# Patient Record
(demographics unavailable — no encounter records)

---

## 2019-02-08 NOTE — EDPHY
H & P


Stated Complaint: Laceration to back of head.


Source: Patient, Family


Exam Limitations: Other (age)





- Personal History


Current Tetanus Diphtheria and Acellular Pertussis (TDAP): Yes





- Medical/Surgical History


Hx Asthma: No


Hx Chronic Respiratory Disease: No


Hx Diabetes: No


Hx Cardiac Disease: No


Hx Renal Disease: No


Hx Cirrhosis: No


Hx Alcoholism: No


Hx HIV/AIDS: No


Hx Splenectomy or Spleen Trauma: No


Other PMH: Denies


Time Seen by Provider: 02/08/19 13:07


HPI/ROS: 


HPI:  This is a 6-year-old male who presents with 





Chief Complaint:  Head injury, scalp laceration





Location:  Scalp


Quality:  Laceration, injury


Duration:  1-2 hours prior to arrival


Signs and Symptoms:  No bleeding, no radiation, no numbness, no weakness, no 

tingling, no incontinence, no decreased range of motion, no swelling, no pain, 

no fever


Timing:  Acute


Severity:  Mild


Context:  Patient was born full term, up-to-date on immunizations, presents 

with both parents with complaints of falling down while at recess at school and 

hitting the back of his head on a piece of playground equipment.  This was a 

witnessed fall and the nurse reports that there was no loss of consciousness.  

Patient cried when he initially hit his head but was easily consolable.  Mother 

and father report that he is behaving normally.  Denies LOC/head injury/neck 

pain/dizziness/nausea/vomiting/amnesia/somnolence.  No history of concussions 

in the past.


Modifying Factors: Direct pressure was applied.





Comment: 








ROS:  A comprehensive 10 system review of systems is otherwise negative aside 

from elements mentioned in the history of present illness. 








MEDICAL/SURGICAL/SOCIAL HISTORY: 


Medical history:  Generally healthy.  Does not take any regular medications.


Surgical history:  Denies


Social history:  Lives in [place] and is enrolled in school.  Lives with 

parents.











CONSTITUTIONAL:  Well-developed, well-nourished red headed adolescent white male

, cooperative with exam, awake and alert, no obvious distress


HEENT:  2 cm, horizontal, deep, simple laceration at the occipital scalp-no 

active bleeding and normocephalic.


NECK: supple, no midline tenderness, flexion 45 degrees, extension 45 degrees, 

right and left lateral flexion 45 degrees. No meningismus.


Cardiovascular: Normal S1/S2, regular rate, regular rhythm, without murmur rub 

or gallop.


PULMONARY/CHEST:  Symmetrical and nontender. no crepitus. Clear to auscultation 

bilaterally. Good air movement. No accessory muscle usage.


ABDOMEN:  Soft, nondistended, nontender, no ecchymosis.


EXTREMITIES:  2/2 pulses, strength 5/5, DIP/PIP/MCP flexion/extension intact 

with good light touch sensation. no deformities, no clubbing, no cyanosis or 

edema.


NEUROLOGICAL: no focal neuro deficits.  GCS 15.  Light touch sensation intact.


SKIN: Warm and dry, no erythema. no rash.  Good capillary refill.   


 (Yolette Mccall)


Constitutional: 





 Initial Vital Signs











Temperature (C)  36.7 C   02/08/19 12:40


 


Heart Rate  98   02/08/19 12:40


 


Respiratory Rate  16 L  02/08/19 12:40


 


Blood Pressure  115/70 H  02/08/19 12:40


 


O2 Sat (%)  97   02/08/19 12:40








 











O2 Delivery Mode               Room Air














Allergies/Adverse Reactions: 


 





No Known Allergies Allergy (Unverified 02/08/19 12:45)


 








Home Medications: 














 Medication  Instructions  Recorded


 


NK [No Known Home Meds]  02/08/19














Medical Decision Making


Procedures: 


Procedure:  Laceration repair.





Verbal consent was obtained from the patient.  The 2.5 cm, deep, simple, 

horizontal laceration on the occipital scalp was anesthetized in the usual 

fashion using LET.  The wound was irrigated, draped and explored to its base 

with a gloved finger.  There were no deep structures involved.  No tendon 

injury was identified.  The wound was repaired with #2 staples.  Good 

hemostasis was achieved and patient tolerated procedure fairly well.  

Bacitracin applied.  The procedure was performed by myself.


 (Yolette Mccall)


ED Course/Re-evaluation: 


Tetanus is up-to-date.


History and physical exam are consistent and there are no concerns for abuse or 

neglect.


Let topical applied, Tylenol given and laceration copiously irrigated


Laceration closed with 2 staples


Verbal and written wound care instructions provided.


No neurological deficits to warrant head CT imaging based on pediatric head CT 

trauma guide.








No signs of neurovascular compromise/tenting of skin/compartment syndrome/

extremities and joints examined above and below area of concern and are 

neurovascularly intact.








This patient was seen under the supervision of my secondary supervising 

physician.  I evaluated care for this patient independently.  Discussed this 

patient with Dr. Marie who did not see the patient.   


 (Yolette Mccall)





I did not see this patient while he was in the emergency department.  However 

his care was discussed with the PA while the patient was in the department.  I 

agree with treatment plan and management (Yaya Marie S)


Differential Diagnosis: 


Head injury including but not limited to concussion, skull fracture, 

intraparenchymal contusion, subarachnoid, subdural and epidural hematoma.


 (Yolette Mccall)





- Data Points


Medications Given: 





 








Discontinued Medications





Acetaminophen (Tylenol 160mg/5ml Oral Liquid)  380 mg PO EDNOW ONE


   Stop: 02/08/19 13:15


   Last Admin: 02/08/19 13:53 Dose:  380 mg


Tetracaine/Epinephrine/Lidocaine (Let Gel Topical)  1 ea TP ONCE ONE


   Stop: 02/08/19 13:15


   Last Admin: 02/08/19 13:30 Dose:  1 ea








Departure





- Departure


Disposition: Home, Routine, Self-Care


Clinical Impression: 


 Laceration of occipital region of scalp without complication





Condition: Good


Instructions:  Head Injury in Children (ED), Staple Care (ED), Laceration in 

Children (ED)


Additional Instructions: 


Keep the stable dry for 48 hours.


After 48 hours, you may wash the site daily with mild soap and water; then pat 

dry. 


Take Tylenol every 4 hours and/or Ibuprofen every 8 hours with food as needed 

for pain/headache. 


Apply ice for 30 minutes at a time; 2-3 times per day for the next 1-2 days. 








Wound Care Follow-Up:


Removal of staples in [ 7 ] days.  Staple removal is complimentary in 

uncomplicated cases.  


Infection or abnormal findings would require reevaluation by the MD.  In that 

case, you may be billed. 








Return to the ER immediately if you have progressive headaches, neurologic 

deficits, gait abnormality, visual disturbance, slurred speech, or any other 

symptom that concerns you. 


 


Referrals: 


DANK SEGOVIA [Primary Care Provider] - As per Instructions